# Patient Record
Sex: FEMALE | Race: WHITE | NOT HISPANIC OR LATINO | Employment: FULL TIME | URBAN - NONMETROPOLITAN AREA
[De-identification: names, ages, dates, MRNs, and addresses within clinical notes are randomized per-mention and may not be internally consistent; named-entity substitution may affect disease eponyms.]

---

## 2024-01-05 ENCOUNTER — OFFICE VISIT (OUTPATIENT)
Dept: URGENT CARE | Facility: CLINIC | Age: 34
End: 2024-01-05
Payer: COMMERCIAL

## 2024-01-05 VITALS
TEMPERATURE: 98 F | HEART RATE: 98 BPM | OXYGEN SATURATION: 99 % | SYSTOLIC BLOOD PRESSURE: 110 MMHG | RESPIRATION RATE: 16 BRPM | DIASTOLIC BLOOD PRESSURE: 62 MMHG

## 2024-01-05 DIAGNOSIS — Z32.01 POSITIVE PREGNANCY TEST: ICD-10-CM

## 2024-01-05 DIAGNOSIS — R42 DIZZINESS: Primary | ICD-10-CM

## 2024-01-05 DIAGNOSIS — R11.0 NAUSEA: ICD-10-CM

## 2024-01-05 PROCEDURE — 99203 OFFICE O/P NEW LOW 30 MIN: CPT

## 2024-01-05 NOTE — PATIENT INSTRUCTIONS
Start taking prenatal vitamins today as discussed.  Tylenol for any pain or fever.  Avoid ibuprofen.  Follow-up with family doctor referral and woman's health doctor to establish care with an OB/GYN.  Make sure to drink plenty of fluids and rest.  If no improvement, follow-up with your family doctor referral.  Go to the emergency room if any symptoms worsen such as severe abdominal pain or vaginal bleeding.  Follow up with PCP in 3-5 days.  Proceed to  ER if symptoms worsen.  Embarazo   LO QUE NECESITA SABER:   Un embarazo normal dura alrededor de 40 semanas. El primer trimestre dura desde blanco último periodo hasta la semana 12 de embarazo. El valentina trimestre se extiende desde la semana 13 hasta la semana 23 de embarazo. El tercer trimestre se extiende desde la semana 24 de embarazo hasta que nazca blanco bebé. Si usted conoce la fecha de blanco último periodo, blanco médico puede calcular la fecha de nacimiento de blanco bebé. Es posible que usted dé a dyana a blanco bebé en cualquier momento desde la semana 37 hasta 2 semanas después de la fecha calculada de parto.  [facility]BRE [facility]:   Regrese a la mark de emergencias si:  Usted presenta un khadijah dolor de gerson que no desaparece.    Usted tiene cambios en la visión nuevos o en aumento, scotty visión borrosa o con manchas.    Usted tiene inflamación nueva o creciente en blanco rodríguez o tejas.    Usted tiene dolor o cólicos en el abdomen o la parte baja de la espalda.    Usted tiene sangrado vaginal.    Llame a blanco médico u obstetra si:  Usted tiene calambres, presión o tensión abdominal.    Usted tiene un cambio en la secreción vaginal.    Usted no puede retener alimentos ni líquidos y está perdiendo peso.    Usted tiene escalofríos o fiebre.    Usted tiene comezón, ardor o dolor vaginal.    Usted tiene michael secreción vaginal amarillenta, verdosa, jessi o de olor desagradable.    Usted tiene dolor o ardor al orinar, orina menos de lo habitual o tiene orina rosada o  sanguinolenta.    Usted tiene preguntas o inquietudes acerca de blanco condición o cuidado.    Medicamentos:  Las vitaminas prenatales suministran parte de las vitaminas y minerales adicionales que usted necesita kwadwo el embarazo. Las vitaminas prenatales también podrían ayudar a disminuir el riesgo de ciertos defectos de nacimiento.    [place] arsenio medicamentos scotty se le haya indicado. Consulte con blanco médico si usted philippe que blanco medicamento no le está ayudando o si presenta efectos secundarios. Infórmele al médico si usted es alérgico a algún medicamento. Mantenga michael lista actualizada de los medicamentos, las vitaminas y los productos herbales que rob. Incluya los siguientes datos de los medicamentos: cantidad, frecuencia y motivo de administración. Traiga con usted la lista o los envases de las píldoras a arsenio citas de seguimiento. Lleve la lista de los medicamentos con usted en channing de michael emergencia.    Atención prenatal: El cuidado prenatal se trata de michael serie de visitas con blanco médico a lo elsy del embarazo. El cuidado prenatal puede ayudar a evitar problemas kwadwo el embarazo y el parto. Kwadwo cada visita prenatal, blanco médico la pesará y examinará blanco presión arterial. Blanco médico también examinará el latido cardíaco y crecimiento de blanco bebé. Es posible que usted también necesite lo siguiente en algunas de arsenio citas:  Un examen pélvico le permite a blanco médico observar blanco jing uterino (la parte inferior de blanco útero). Blanco médico usará un espéculo para abrir la vagina. Examinará el tamaño y la forma de blanco útero. En blanco primera visita prenatal, es posible que también le hien michael prueba de [place]. Syeda es un examen para detectar células anormales en el jing uterino.    Los análisis de karey podrían realizarse para buscar si hay signos de lo siguiente:     Diabetes gestacional o anemia (bajo nivel de dorothy)    Tipo de karey o factor Rh, o ciertos defectos congénitos    Inmunidad a ciertas enfermedades,  scotty la varicela o la rubéola    Michael infección, scotty michael infección de transmisión sexual, VIH o hepatitis B    Hepatitis B puede necesitar prevención o tratamiento. La hepatitis B es la inflamación del hígado causada por el virus de la hepatitis B (VHB). El VHB puede transmitirse de michael madre a blanco bebé lupe el parto. Se le hará michael prueba de detección del VHB lo antes posible lupe el primer trimestre de cada embarazo. Usted necesita la prueba incluso si recibió la vacuna contra la hepatitis B o si se la hicieron antes. Es posible que necesite que le traten michael infección por el VHB antes de janak a dyana.    Los análisis de orina también podría realizarse para revisarle el azúcar y la proteína. Estas son señales de diabetes gestacional o preeclampsia. También podrían realizarle análisis de orina para revisar si hay signos de infección.    La detección de diabetes gestacional podría realizarse. Blanco médico le puede ordenar la curva de tolerancia a la glucosa (OGTT) de 1 paso o de 2 pasos.     Examen de tolerancia a la glucosa en 1 paso: A usted le revisarán el nivel de azúcar en la karey después de que no haya comido por 8 horas (en ayunas). Luego le darán a angeles michael solución de glucosa. Le examinarán el nivel de glucosa nuevamente 1 hora y 2 horas después de terminar la bebida.    Examen de tolerancia a la glucosa en 2 paso: Usted no tiene que ayunar para la primera parte del examen. Usted se tomará la bebida de glucosa a cualquier hora del día. A usted le revisarán el nivel de azúcar en la karey al cabo de 1 hora. En channing que el nivel de azúcar esté más alto que cierto nivel, le ordenarán otro examen. Usted tendrá que ayunar para que le revisen el azúcar en blanco karey. Usted se tomará la bebida de glucosa. Le examinarán la karey de nuevo 1 hora, 2 horas y 3 horas después de terminarse la bebida de glucosa.    Michael ecografía del feto muestra imágenes del bebé dentro de blanco útero. Las imágenes se utilizan para  verificar el desarrollo, el movimiento y la posición del bebé.         Se le pueden ofrecer pruebas de detección de trastornos genéticos. Estos exámenes revisan el riesgo de blanco bebé de trastornos genéticos scotty el síndrome de [place]. Un examen de detección podría incluir análisis de karey y michael ecografía. Los análisis de karey pueden usarse para comprobar blanco ADN o el de blanco abhi. Las pruebas genéticas no siempre son exactas o completas. Blanco bebé puede nacer con un trastorno genético que no ha aparecido en las pruebas. Hable con blanco médico acerca de cualquier inquietud que usted tenga sobre las pruebas genéticas.    Cambios corporales que pueden ocurrir lupe blanco embarazo:  Los cambios en los senos que usted experimentará incluyen sensibilidad y cosquilleo lupe la primera parte de blanco embarazo. Los senos se volverán más grandes. Es posible que necesite un sostén con soporte. Es posible que usted ofelia michael secreción delgada y amarilla, conocida scotty calostro, que sale de arsenio pezones lupe el valentina trimestre. El calostro es un líquido que se convertirá en leche alrededor de 3 días después de usted howard dado a dyana.    Cambios en la piel y estrías podrían ocurrir lupe blanco embarazo. Es posible que usted tenga marcas jamison, conocidas scotty estrías, en blanco piel. Las estrías usualmente se desvanecen después del embarazo. Utilice crema si blanco piel está seca y con comezón. La piel de blanco rodríguez, alrededor de los pezones y debajo de blanco ombligo podría oscurecerse. La mayoría del tiempo, blanco piel volverá a blanco color normal después del nacimiento de blanoc bebé.    El malestar matutino consiste en náuseas y vómitos que pueden ocurrir en cualquier momento del día. Evite los alimentos grasosos y picantes. Coma comidas pequeñas lupe el día en vez de porciones grandes. El jengibre puede ayudar a disminuir las náuseas. Consulte con blanco médico acerca de otras formas para disminuir las náuseas y el vómito.    Acidez estomacal puede ser  causada por los cambios hormonales lupe blanco embarazo. El útero en crecimiento puede empujar blanco estómago hacia arriba y forzar ácido estomacal a acumularse dentro de blanco esófago. Dysart 4 o 5 comidas pequeñas cada día en vez de comidas grandes. Evite los alimentos picantes. Evite comer faizan antes de irse a la cama.         Estreñimiento puede desarrollarse lupe blanco embarazo. Para tratar el estreñimiento, coma alimentos altos en fibra scotty cereales con fibra, frijoles, frutas, verduras, panes integrales y jugo de ciruela. Martha ejercicio de manera regular y tome suficiente agua. Es posible que blanco médico sugiera un suplemento con fibra para ablandar arsenio evacuaciones intestinales. Consulte con blanco médico antes de usar cualquier medicamento para disminuir el estreñimiento.         Las hemorroides son venas grandes en el área rectal. Pueden causar dolor, comezón y sangrado de color fontaine vivo en blanco recto. Para disminuir el riesgo de hemorroides, prevenga el estreñimiento y no se esfuerce cuando tenga michael evacuación intestinal. Si usted tiene hemorroides, sumérjase en michael bañera con agua tibia para aliviar la incomodidad. Consulte con blanco médico cómo puede tratar las hemorroides.    Los calambres y la hinchazón en las piernas pueden ser causados por niveles bajos de calcio o por el peso adicional del embarazo. Eleve arsenio piernas por encima del nivel de blanco corazón para disminuir la hinchazón. Lupe un calambre en la pierna, estire o de un masaje al músculo que tiene el calambre. El calor puede ayudar a disminuir el dolor y los espasmos musculares. Aplique calor sobre el músculo por 20 a 30 minutos cada 2 horas por la cantidad de días que se le indique.         Dolor en la espalda puede ocurrir a medida que blanco bebé crece. No esté de pie por largos periodos de tiempo ni levante objetos pesados. Use michael buena postura mientras esté de pie, se agache o se doble. Use zapatos de tacón bajo con un buen soporte. Descansar puede  también ayudarla a aliviar el dolor de espalda. Pregunte a blanco médico acerca de ejercicios que usted pueda hacer para fortalecer los músculos de blanco espalda.    Manténgase saludable lupe blanco embarazo:      Consuma alimentos saludables y variados. Alimentos saludables incluyen frutas, verduras, panes de kunal integral, alimentos lácteos bajos en grasa, frijoles, gaurav magras y pescado. [place] líquidos scotty se le haya indicado. Pregunte cuánto líquido debe angeles cada día y cuáles líquidos son los más adecuados para usted. Limite el consumo de cafeína a menos de 200 miligramos cada día. Limite el consumo de pescado a 2 porciones cada semana. Escoja pescado con concentraciones bajas de becca scotty atún ligero enlatado, camarón, cangrejo, salmón, bacalao o tilapia. No coma pescado con concentraciones altas de becca scotty pez melissa, caballa gigante, pargo rayado y tiburón.         [place] vitaminas prenatales según las indicaciones. Blanco necesidad de ciertas vitaminas y minerales, scotty el ácido fólico, aumenta lupe el embarazo. Las vitaminas prenatales proporcionan algunas de las vitaminas y minerales adicionales que usted necesita. Las vitaminas prenatales también podrían ayudar a disminuir el riesgo de ciertos defectos de nacimiento.         Pregunte cuánto peso usted debe aumentar lupe blanco embarazo. Demasiado aumento de peso o muy poco puede ser poco saludable para usted y blanco bebé.    Consulte con blanco médico acerca de hacer ejercicio. El ejercicio moderado puede ayudarla a mantenerse en forma. Blanco médico la ayudará a planear un programa de ejercicios que sea seguro para usted lupe blanco embarazo.         No fume. El tabaquismo aumenta el riesgo de un aborto espontáneo y de problemas cardíacos y vasculares. Fumar puede causar que blanco bebé nazca antes de tiempo o que pese menos al nacer. Deje de fumar tan pronto scotty usted crea que podría estar embarazada. Solicite información a blanco médico si usted necesita ayuda para  dejar de fumar.    No consuma alcohol. El alcohol pasa de blanco cuerpo al bebé a través de la placenta. Puede afectar el desarrollo del cerebro de blanco bebé y provocar el síndrome de alcoholismo fetal (SAF). El SAF es un paulina de condiciones que causan problemas mentales, de comportamiento y de crecimiento.    Consulte con blanco médico antes de angeles cualquier medicamento. Muchos medicamentos pueden perjudicar a blanco bebé si usted los rob mientras está embarazada. No tome ningún medicamento, vitaminas, hierbas o suplementos sin diego consultar con blanco médico. Nunca  use drogas ilegales o de la ramirez (scotty marihuana o cocaína) mientras está embarazada.  Consejos de seguridad:  Evite jacuzzis y saunas. No use un jacuzzi o un sauna mientras usted está embarazada, especialmente lupe el primer trimestre. Los jacuzzis y los saunas aumentan la temperatura de blanco bebé y el riesgo de defectos de nacimiento.    Evite la toxoplasmosis. [place] es michael infección causada por comer carne cruda o estar cerca del excremento de un fatemeh infectado. [place] puede causar malformaciones congénitas, aborto espontáneo y otros problemas. Lávese las tejas después de tocar carne cruda. Asegúrese de que la carne esté babak cocida antes de comerla. Evite los huevos crudos y la leche despasteurizada. Use guantes o pida que alguien la ayude a limpiar la caja de arena del fatemeh mientras usted está embarazada.    Consulte con blanco médico acerca de viajar. El tiempo más cómodo para viajar es lupe el valentina trimestre. Pregunte a blanco médico si usted puede viajar después de las 36 semanas. Es posible que no pueda viajar en avión después de las 36 semanas. También le puede recomendar que evite los viajes largos por carretera.    Acuda a arsenio consultas de control con blanco médico u obstetra según le indicaron: Vaya a todas arsenio citas prenatales lupe blanco embarazo. Anote arsenio preguntas para que se acuerde de hacerlas lupe arsenio visitas.  © Copyright [employer] 2023  Information is for End User's use only and may not be sold, redistributed or otherwise used for commercial purposes.  Esta información es sólo para uso en educación. Blanco intención no es darle un consejo médico sobre enfermedades o tratamientos. Colsulte con blanco médico, enfermera o farmacéutico antes de seguir cualquier régimen médico para saber si es seguro y efectivo para usted.  Náusea y vómito en el embarazo   LO QUE NECESITA SABER:   La náusea y el vómito pueden suceder a cualquier hora del día. Estos síntomas usualmente comienzan antes de la semana 9 del embarazo y terminan para la semana 14 (valentina trimestre). Algunas mujeres pueden tener náusea o vómito por un tiempo prolongado. Estos síntomas pueden dificultarle tegan actividades diarias.  [facility]:   Regrese a la mark de emergencias si:  Usted está mareado, tiene frío, sed y sequedad en los ojos y la boca.    Usted está orinando muy poco o nada en absoluto.    Usted tiene mareos o desvanecimiento al ponerse de pie.    Usted ve karey o un material que parece café molido en el vómito.    Llame a blanco médico si:  Usted vomita más de 4 veces en 1 día.    Usted no ha podido retener líquidos en el estómago por más de 1 día.    Usted pierde más de 2 libras.    Tiene fiebre.    Tegan náuseas y vómito continúan por más de 14 semanas.    Usted tiene preguntas o inquietudes acerca de blanco condición o cuidado.    Cambios de nutrición que usted puede realizar para controlar la náusea y el vómito:  Ingiera comidas más pequeñas, más a menudo. [place] meriendas pequeñas, scotty galletas saladas, cereal seco o un sándwich chico antes de acostarse.    Coma galletas saladas o pan marylin antes de levantarse de blanco cama por la mañana. Levántese de la cama lentamente. Los movimientos repentinos podrían provocarle mareos y náusea.    Consuma alimentos blandos cuando se sienta con náuseas. Ejemplos de alimentos blandos son el pan marylin, cereal seco, pasta sin  salsa, arroz demarco y pan. Otros alimentos blandos incluyen a las galletas saladas, plátanos, gelatina y pretzels. Evite los alimentos condimentados, grasosos y fritos.    [place]han líquidos que contengan jengibre. Mehan refresco de jengibre hecho con jengibre real o té de jengibre hecho con jengibre fresco rallado. Las cápsulas o dulces de jengibre también podrían ayudar a aliviar la náusea y el vómito.    [place] líquidos entre alimentos en vez de tomarlos con los alimentos. Espere al menos 30 minutos después de comer para angeles líquidos. [place] cantidades pequeñas de líquidos con frecuencia lupe el día para evitar la deshidratación. Consulte cuál es la cantidad de líquido que usted debería consumir al día.    Otros cambios que usted puede realizar para controlar la náusea y el vómito:  Evite los olores que la molestan. Los olores harsh podrían provocar que comiencen las náuseas y el vómito, o podrían empeorarlo.    No se cepille arsenio dientes inmediatamente después de comer si eso le provoca náuseas.    Descanse cuando lo necesite. Comience michael actividad lentamente y vuelva a blanco rutina normal conforme se empiece a sentir mejor.    Hable con blanco médico acerca de las vitaminas prenatales. Las vitaminas prenatales pueden provocar náuseas a algunas mujeres. Trate de tomárselas por la noche o con un bocadillo. Si yohana cambio no le ayuda, blanco médico podría recomendarle un tipo de vitamina diferente.    El ejercicio de ligero a moderado podría ayudar a aliviar arsenio síntomas. También podría ayudarla a dormir mejor por la noche. Pregunte a blanco médico acerca del mejor plan de ejercicio para usted.    Acuda a la consulta de control con blanco médico según las indicaciones: Anote arsenio preguntas para que se acuerde de hacerlas lupe arsenio visitas.  © Copyright [employer] 2023 Information is for End User's use only and may not be sold, redistributed or otherwise used for commercial purposes.  Esta información es sólo para uso en educación. Blanco  intención no es darle un consejo médico sobre enfermedades o tratamientos. Colsulte con blanco médico, enfermera o farmacéutico antes de seguir cualquier régimen médico para saber si es seguro y efectivo para usted.

## 2024-01-05 NOTE — PROGRESS NOTES
[facility]        NAME: Cinda Nair is a 33 y.o. female  : 1990    MRN: [id number]  DATE: 2024  TIME: 3:39 PM    Assessment and Plan   Dizziness [R42]  1. Dizziness        2. Nausea  CANCELED: POCT urine HCG      3. Positive pregnancy test  Ambulatory Referral to Obstetrics / Gynecology    Ambulatory Referral to Family Practice        Nausea and dizziness for 2 weeks.  Nausea related to the smell of food.  Patient finally states that she had a positive pregnancy test recently.  Last normal menstrual period was 11/15/2023.  This is her second pregnancy, has 1 living child at home.  Does not have a family doctor or OB/GYN.  Given referral to both.  Advised to start prenatal vitamins.  Advised to go to the ER if any symptoms worsen.  Patient Instructions     Start taking prenatal vitamins today as discussed.  Tylenol for any pain or fever.  Avoid ibuprofen.  Follow-up with family doctor referral and woman's health doctor to establish care with an OB/GYN.  Make sure to drink plenty of fluids and rest.  If no improvement, follow-up with your family doctor referral.  Go to the emergency room if any symptoms worsen such as severe abdominal pain or vaginal bleeding.  Follow up with PCP in 3-5 days.  Proceed to  ER if symptoms worsen.    Chief Complaint     Chief Complaint   Patient presents with    Dizziness     Joseph ([id number])  Started 2 weeks ago  Possible pregnancy?  No PCP     Nausea         History of Present Illness       33-year-old female presents to the clinic with nausea and occasional dizzy spells that have been occurring every other day over the past 2 weeks.  States that nausea is worse when the smell of food.  Patient reluctantly states that she had a positive home pregnancy test.  This is her second pregnancy, has 1 living child at home.  Denies having a family doctor or an OB/GYN.  Denies any over-the-counter medications.  Denies fever, chills, chest  pain, shortness of breath, abdominal pain, urinary frequency/urgency/dysuria/hematuria/vaginal bleeding,vomiting, diarrhea, constipation.        Review of Systems   Review of Systems   Constitutional: Negative.    HENT: Negative.     Respiratory: Negative.     Cardiovascular: Negative.    Gastrointestinal:  Positive for nausea. Negative for abdominal distention, abdominal pain, blood in stool, constipation, diarrhea and vomiting.   Musculoskeletal: Negative.    Skin: Negative.    Neurological:  Positive for dizziness. Negative for syncope, facial asymmetry, speech difficulty, weakness, light-headedness, numbness and headaches.   Psychiatric/Behavioral: Negative.           Current Medications     No current outpatient medications on file.    Current Allergies     Allergies as of 01/05/2024    (No Known Allergies)            The following portions of the patient's history were reviewed and updated as appropriate: allergies, current medications, past family history, past medical history, past social history, past surgical history and problem list.     History reviewed. No pertinent past medical history.    History reviewed. No pertinent surgical history.    History reviewed. No pertinent family history.      Medications have been verified.        Objective   /62   Pulse 98   Temp 98 °F (36.7 °C)   Resp 16   SpO2 99%        Physical Exam     Physical Exam  Constitutional:       General: She is not in acute distress.     Appearance: Normal appearance. She is not ill-appearing.   HENT:      Head: Normocephalic and atraumatic.      Right Ear: Tympanic membrane, ear canal and external ear normal.      Left Ear: Tympanic membrane, ear canal and external ear normal.      Nose: No congestion.      Mouth/Throat:      Mouth: Mucous membranes are moist.      Pharynx: Oropharynx is clear. No oropharyngeal exudate or posterior oropharyngeal erythema.   Eyes:      Extraocular Movements: Extraocular movements intact.       Conjunctiva/sclera: Conjunctivae normal.      Pupils: Pupils are equal, round, and reactive to light.   Cardiovascular:      Rate and Rhythm: Normal rate and regular rhythm.      Pulses: Normal pulses.      Heart sounds: Normal heart sounds. No murmur heard.     No friction rub. No gallop.   Pulmonary:      Effort: Pulmonary effort is normal. No respiratory distress.      Breath sounds: Normal breath sounds. No stridor. No wheezing, rhonchi or rales.   Chest:      Chest wall: No tenderness.   Abdominal:      General: Abdomen is flat. Bowel sounds are normal. There is no distension.      Palpations: Abdomen is soft. There is no mass.      Tenderness: There is no abdominal tenderness. There is no guarding or rebound.   Musculoskeletal:      Cervical back: Normal range of motion and neck supple. No tenderness.   Lymphadenopathy:      Cervical: No cervical adenopathy.   Skin:     General: Skin is warm and dry.      Capillary Refill: Capillary refill takes less than 2 seconds.      Findings: No rash.   Neurological:      General: No focal deficit present.      Mental Status: She is alert and oriented to person, place, and time. Mental status is at baseline.      GCS: GCS eye subscore is 4. GCS verbal subscore is 5. GCS motor subscore is 6.      Cranial Nerves: Cranial nerves 2-12 are intact. No cranial nerve deficit, dysarthria or facial asymmetry.      Sensory: Sensation is intact. No sensory deficit.      Motor: Motor function is intact. No weakness, abnormal muscle tone or seizure activity.      Coordination: Coordination is intact.      Gait: Gait is intact.      Deep Tendon Reflexes: Reflexes are normal and symmetric. Reflexes normal.   Psychiatric:         Mood and Affect: Mood normal.         Behavior: Behavior normal.         Thought Content: Thought content normal.         Judgment: Judgment normal.